# Patient Record
Sex: MALE | Race: ASIAN
[De-identification: names, ages, dates, MRNs, and addresses within clinical notes are randomized per-mention and may not be internally consistent; named-entity substitution may affect disease eponyms.]

---

## 2019-04-03 ENCOUNTER — HOSPITAL ENCOUNTER (INPATIENT)
Dept: HOSPITAL 36 - ER | Age: 69
LOS: 3 days | Discharge: NURSING, REHAB OR LONG-TERM CARE | DRG: 689 | End: 2019-04-06
Attending: FAMILY MEDICINE | Admitting: FAMILY MEDICINE
Payer: MEDICARE

## 2019-04-03 VITALS — DIASTOLIC BLOOD PRESSURE: 73 MMHG | SYSTOLIC BLOOD PRESSURE: 118 MMHG

## 2019-04-03 DIAGNOSIS — E78.5: ICD-10-CM

## 2019-04-03 DIAGNOSIS — R42: ICD-10-CM

## 2019-04-03 DIAGNOSIS — R53.2: ICD-10-CM

## 2019-04-03 DIAGNOSIS — I10: ICD-10-CM

## 2019-04-03 DIAGNOSIS — R55: ICD-10-CM

## 2019-04-03 DIAGNOSIS — Z59.0: ICD-10-CM

## 2019-04-03 DIAGNOSIS — R62.7: ICD-10-CM

## 2019-04-03 DIAGNOSIS — N39.0: Primary | ICD-10-CM

## 2019-04-03 DIAGNOSIS — B35.3: ICD-10-CM

## 2019-04-03 DIAGNOSIS — I69.351: ICD-10-CM

## 2019-04-03 LAB
ALBUMIN SERPL-MCNC: 4.3 GM/DL (ref 4.2–5.5)
ALBUMIN/GLOB SERPL: 1.2 {RATIO} (ref 1–1.8)
ALP SERPL-CCNC: 51 U/L (ref 34–104)
ALT SERPL-CCNC: 15 U/L (ref 7–52)
AMORPH SED URNS QL MICRO: (no result)
AMPHET UR-MCNC: NEGATIVE NG/ML
AMYLASE SERPL-CCNC: 243 U/L (ref 29–103)
ANION GAP SERPL CALC-SCNC: 13.1 MMOL/L (ref 7–16)
APAP SERPL-MCNC: < 10 UG/ML (ref 10–30)
APPEARANCE UR: (no result)
AST SERPL-CCNC: 16 U/L (ref 13–39)
BACTERIA #/AREA URNS HPF: (no result) /HPF
BARBITURATES UR-MCNC: NEGATIVE UG/ML
BASOPHILS # BLD AUTO: 0.1 TH/CUMM (ref 0–0.2)
BASOPHILS NFR BLD AUTO: 1 % (ref 0–2)
BENZODIAZEPINES PNL UR: NEGATIVE
BILIRUB SERPL-MCNC: 0.4 MG/DL (ref 0.3–1)
BILIRUB UR-MCNC: NEGATIVE MG/DL
BUN SERPL-MCNC: 23 MG/DL (ref 7–25)
CALCIUM SERPL-MCNC: 9.7 MG/DL (ref 8.6–10.3)
CANNABINOIDS SERPL QL CFM: NEGATIVE
CHLORIDE SERPL-SCNC: 107 MEQ/L (ref 98–107)
CHOLEST SERPL-MCNC: 170 MG/DL (ref ?–200)
CO2 SERPL-SCNC: 22.9 MEQ/L (ref 21–31)
COCAINE METAB.OTHER UR-MCNC: NEGATIVE NG/ML
COLOR UR: YELLOW
CREAT SERPL-MCNC: 1.4 MG/DL (ref 0.7–1.3)
EOSINOPHIL # BLD AUTO: 0.7 TH/CMM (ref 0.1–0.4)
EOSINOPHIL NFR BLD AUTO: 10.7 % (ref 0–5)
EPI CELLS URNS QL MICRO: (no result) /LPF
ERYTHROCYTE [DISTWIDTH] IN BLOOD BY AUTOMATED COUNT: 13 % (ref 11.5–20)
GLOBULIN SER-MCNC: 3.5 GM/DL
GLUCOSE SERPL-MCNC: 105 MG/DL (ref 70–105)
GLUCOSE UR STRIP-MCNC: NEGATIVE MG/DL
HCT VFR BLD CALC: 39.1 % (ref 41–60)
HDLC SERPL-MCNC: 50 MG/DL (ref 23–92)
HGB BLD-MCNC: 12.8 GM/DL (ref 12–16)
INR PPP: 0.88 (ref 0.5–1.4)
KETONES UR STRIP-MCNC: NEGATIVE MG/DL
LEUKOCYTE ESTERASE UR-ACNC: (no result)
LIPASE SERPL-CCNC: 64 U/L (ref 11–82)
LYMPHOCYTE AB SER FC-ACNC: 1.4 TH/CMM (ref 1.5–3)
LYMPHOCYTES NFR BLD AUTO: 21.9 % (ref 20–50)
MCH RBC QN AUTO: 27.5 PG (ref 27–31)
MCHC RBC AUTO-ENTMCNC: 32.7 PG (ref 28–36)
MCV RBC AUTO: 84.3 FL (ref 80–99)
METHADONE UR CFM-MCNC: NEGATIVE NG/ML
METHAMPHET UR QL: NEGATIVE
MICRO URNS: YES
MONOCYTES # BLD AUTO: 0.5 TH/CMM (ref 0.3–1)
MONOCYTES NFR BLD AUTO: 7.5 % (ref 2–10)
NEUTROPHILS # BLD: 3.5 TH/CMM (ref 1.8–8)
NEUTROPHILS NFR BLD AUTO: 58.9 % (ref 40–80)
NITRITE UR QL STRIP: POSITIVE
OPIATES UR QL: NEGATIVE
PCP UR-MCNC: NEGATIVE UG/L
PH UR STRIP: 6.5 [PH] (ref 4.6–8)
PLATELET # BLD: 396 TH/CMM (ref 150–400)
PMV BLD AUTO: 6.3 FL
POTASSIUM SERPL-SCNC: 4 MEQ/L (ref 3.5–5.1)
PROT UR STRIP-MCNC: 100 MG/DL
PROTHROMBIN TIME: 9.3 SECONDS (ref 9.5–11.5)
RBC # BLD AUTO: 4.64 MIL/CMM (ref 3.8–5.8)
RBC # UR STRIP: (no result) /UL
RBC #/AREA URNS HPF: (no result) /HPF (ref 0–5)
SALICYLATES SERPL-MCNC: < 25 MG/L (ref 30–100)
SODIUM SERPL-SCNC: 139 MEQ/L (ref 136–145)
SP GR UR STRIP: 1.01 (ref 1–1.03)
TRICYCLICS UR QL: NEGATIVE
TRIGL SERPL-MCNC: 94 MG/DL (ref ?–150)
URINALYSIS COMPLETE PNL UR: (no result)
UROBILINOGEN UR STRIP-ACNC: 0.2 E.U./DL (ref 0.2–1)
WBC # BLD AUTO: 6.2 TH/CMM (ref 4.8–10.8)
WBC #/AREA URNS HPF: (no result) /HPF (ref 0–5)

## 2019-04-03 PROCEDURE — Z7610: HCPCS

## 2019-04-03 RX ADMIN — DEXTROSE AND SODIUM CHLORIDE SCH MLS/HR: 5; .45 INJECTION, SOLUTION INTRAVENOUS at 23:24

## 2019-04-03 SDOH — ECONOMIC STABILITY - HOUSING INSECURITY: HOMELESSNESS: Z59.0

## 2019-04-03 NOTE — ED PHYSICIAN CHART
ED Chief Complaint/HPI





- Patient Information


Date Seen:: 19


Time Seen:: 18:00


Chief Complaint:: Poor Oral Intake


History of Present Illness:: 





onset x 3 days of poor oral intake, weakness, and dizziness/vertigo; no report 

of trauma, H/As, S/T, neck pain, cough, C/P, SOB, Abd. Pain, A/N/V/D/C, fever, 

chills, or urinary s/s


Historian:: Patient


Review:: Nurse's Note Reviewed, Old Chart Reviewed





ED Review of Systems





- Review of Systems


General/Constitutional: No fever, No chills, No weight loss, Weakness, No 

diaphoresis, No edema, No loss of appetite


Skin: No skin lesions, No rash, No bruising


Head: No headache, No light-headedness


Eyes: No loss of vision, No pain, No diplopia


ENT: No earache, No nasal drainage, No sore throat, No tinnitus


Neck: No neck pain, No swelling, No thyromegaly, No stiffness, No mass noted


Cardio Vascular: No chest pain, No palpitations, No PND, No orthopnea, No edema


Pulmonary: No SOB, No cough, No sputum, No wheezing


GI: No nausea, No vomiting, No diarrhea, No pain, No melena, No hematochezia, 

No constipation, No hematemesis


G/U: No dysuria, No frequency, No hematuria, No nacturia


Musculoskeletal: No bone or joint pain, No back pain, No muscle pain


Endocrine: No polyuria, No polydipsia


Psychiatric: No prior psych history, No depression, No anxiety, No suicidal 

ideation, No homicidal ideation, No auditory hallucination, No visual 

hallucination


Hematopoietic: No bruising, No lymphadenopathy


Allergic/Immuno: No urticaria, No angioedema


Neurological: No syncope, No focal symptoms, Weakness, No paresthesia, No 

headache, No seizure, Dizziness, No confusion, Vertigo





ED Past Medical History





- Past Medical History


Obtainable: Yes


Past Medical History: HTN, Dyslipidemia


Family History: HTN


Social History: Non Smoker, No Alcohol, No Drug Use, Single


Surgical History: None


Psychiatricy History: None


Medication: Reviewed





Family Medical History





- Family Member


  ** Mother


Living Status: 





ED Physical Exam





- Physical Examination


General/Constitutional: Awake, Well-developed, well-nourished, Alert, No 

distress, GCS 15, Non-toxic appearing, Ambulatory


Head: Atraumatic


Eyes: Lids, conjuctiva normal, PERRL, EOMI


Skin: Nl inspection, No rash, No skin lesions, No ecchymosis, Well hydrated, No 

lymphadenopathy


ENMT: External ears, nose nl, TM canals nl, Nasal exam nl, Lips, teeth, gums nl

, Oropharynx nl, Tonsils nl


Neck: Nontender, Full ROM w/o pain, No JVD, No nuchal rigidity, No bruit, No 

mass, No stridor


Respiratory: Nl effort/Exclusion, Clear to Auscultation, No Wheeze/Rhonchi/Rales


Cardio Vascular: RRR, No murmur, gallop, rubs, NL S1 S2, Carotid/Femoral/Distal 

pulses equal bilaterally


GI: No tenderness/rebounding/guarding, No organomegaly, No hernia, Normal BS's, 

Nondistended, No mass/bruits, No McBurney tenderness


: No CVA tenderness


Extremities: No tenderness or effusion, Full ROM, normal strength in all 

extremities, No edema, Normal digits & nails


Neuro/Psych: Alert/oriented, DTR's symmetric, Normal sensory exam, Normal motor 

strength, Judgement/insight normal, Mood normal, Normal gait, No focal deficits


Misc: Normal back, No paraspinal tenderness





ED Labs/Radiology/EKG Results





- Lab Results


Comments:: 





Reviewed





- Radiology Results


Comments:: 





CXR: COPD; NAD





- EKG Interpretations


EKG Time:: 18:22


Rate & Rhythm: 63; NSR


Comments:: 





non-specific st-t changes





ED Septic Shock





- .


Is Septic Shock (SBP<90, OR Lactate>4 mmol\L) present?: No





ED Reassessment (Disposition)





- Reassessment


Reassessment Condition:: Improved





- Diagnosis


Diagnosis:: 





Poor Oral Intake; Weakness; Dizziness; Vertigo; TIA; CVA

## 2019-04-04 LAB
ALBUMIN SERPL-MCNC: 3.6 GM/DL (ref 4.2–5.5)
ALBUMIN/GLOB SERPL: 1.2 {RATIO} (ref 1–1.8)
ALP SERPL-CCNC: 43 U/L (ref 34–104)
ALT SERPL-CCNC: 12 U/L (ref 7–52)
AMYLASE SERPL-CCNC: 323 U/L (ref 29–103)
ANION GAP SERPL CALC-SCNC: 11 MMOL/L (ref 7–16)
AST SERPL-CCNC: 14 U/L (ref 13–39)
BASOPHILS # BLD AUTO: 0 TH/CUMM (ref 0–0.2)
BASOPHILS NFR BLD AUTO: 0.5 % (ref 0–2)
BILIRUB SERPL-MCNC: 0.4 MG/DL (ref 0.3–1)
BUN SERPL-MCNC: 19 MG/DL (ref 7–25)
CALCIUM SERPL-MCNC: 9 MG/DL (ref 8.6–10.3)
CHLORIDE SERPL-SCNC: 109 MEQ/L (ref 98–107)
CO2 SERPL-SCNC: 23.5 MEQ/L (ref 21–31)
CREAT SERPL-MCNC: 1.3 MG/DL (ref 0.7–1.3)
EOSINOPHIL # BLD AUTO: 0.6 TH/CMM (ref 0.1–0.4)
EOSINOPHIL NFR BLD AUTO: 8.6 % (ref 0–5)
ERYTHROCYTE [DISTWIDTH] IN BLOOD BY AUTOMATED COUNT: 12.5 % (ref 11.5–20)
GLOBULIN SER-MCNC: 2.9 GM/DL
GLUCOSE SERPL-MCNC: 104 MG/DL (ref 70–105)
HCT VFR BLD CALC: 35.9 % (ref 41–60)
HGB BLD-MCNC: 12 GM/DL (ref 12–16)
LIPASE SERPL-CCNC: 500 U/L (ref 11–82)
LYMPHOCYTE AB SER FC-ACNC: 1 TH/CMM (ref 1.5–3)
LYMPHOCYTES NFR BLD AUTO: 15 % (ref 20–50)
MCH RBC QN AUTO: 27.9 PG (ref 27–31)
MCHC RBC AUTO-ENTMCNC: 33.4 PG (ref 28–36)
MCV RBC AUTO: 83.7 FL (ref 80–99)
MONOCYTES # BLD AUTO: 0.5 TH/CMM (ref 0.3–1)
MONOCYTES NFR BLD AUTO: 7.7 % (ref 2–10)
NEUTROPHILS # BLD: 4.8 TH/CMM (ref 1.8–8)
NEUTROPHILS NFR BLD AUTO: 68.2 % (ref 40–80)
PLATELET # BLD: 337 TH/CMM (ref 150–400)
PMV BLD AUTO: 6.8 FL
POTASSIUM SERPL-SCNC: 3.5 MEQ/L (ref 3.5–5.1)
RBC # BLD AUTO: 4.28 MIL/CMM (ref 3.8–5.8)
SODIUM SERPL-SCNC: 140 MEQ/L (ref 136–145)
WBC # BLD AUTO: 6.9 TH/CMM (ref 4.8–10.8)

## 2019-04-04 RX ADMIN — CLOTRIMAZOLE AND BETAMETHASONE DIPROPIONATE SCH APPL: 10; .5 CREAM TOPICAL at 09:44

## 2019-04-04 RX ADMIN — DEXTROSE AND SODIUM CHLORIDE SCH MLS/HR: 5; .45 INJECTION, SOLUTION INTRAVENOUS at 17:05

## 2019-04-04 RX ADMIN — CLOTRIMAZOLE AND BETAMETHASONE DIPROPIONATE SCH APPL: 10; .5 CREAM TOPICAL at 17:05

## 2019-04-04 RX ADMIN — Medication SCH: at 17:04

## 2019-04-04 NOTE — CONSULTATION
DATE OF CONSULTATION:  04/04/2019



NEUROLOGY CONSULTATION



HISTORY OF PRESENT ILLNESS:  A 68-year-old.  The patient complains of dizziness.

 Unsteadiness.  The patient with a history of stroke with right-sided

hemiplegia.  He has a brace over the right leg.  Right arm essentially no

movement, spastic, semi-flexed.  The patient able to talk and answer.



PAST MEDICAL HISTORY:

1.  Previous stroke.

2.  Hypertension.

3.  Dyslipidemia.



MEDICATIONS:  As per reconciliation.



REVIEW OF SYSTEMS:  Twelve point negative except for above.



PHYSICAL EXAMINATION:

VITAL SIGNS:  Temperature 98.3, blood pressure 130/70, pulse is 74.

NECK:  Supple.  No neck bruits.

HEART:  Sound S1, S2.

LUNGS:  Clear.

NEUROLOGIC:  The patient is awake.  The patient will answer question slightly

dysarthric speech.  Pupils react to light.  Right facial weakness, droop.

MOTOR:  He has spasticity of the right arm, semi-flexed.  Not much movement. 

Fingers are curled up.  Right legs, is able to lift up, but limited movement,

increased tone.  There is dressing on the right foot.  On the left side, he is

able to move them up.  Reflexes 1-2+ upper extremity, more on the right.  Knees

are about 2+.  Ankles -1.  Withdrawal of the toes.



INVESTIGATIONS:  CT scan of the head, no acute process.  Cervical spine CT, no

acute process.



LABORATORY DATA:  WBC 6.9, hemoglobin 12.0, and platelets 337.  Sodium 140.  TSH

0.88.  Lipase is 500 and amylase 323.  The patient has positive UA with WBCs 2-5

and 4+ bacteria.



ASSESSMENT:

1.  Unsteadiness, dizziness.  No acute stroke in the CT scan.  We will go ahead

and do carotid Doppler studies.  Check his labs.

2.  History of previous stroke.

3.  Hypertension.

4.  Urinary tract infection.



The patient will continue present treatment, will need physical therapy.





DD: 04/04/2019 07:48

DT: 04/04/2019 22:46

JOB# [id number]  [id number]

## 2019-04-04 NOTE — DIAGNOSTIC IMAGING REPORT
CT scan of the brain without intravenous contrast



HISTORY: Dizziness



Total DLP equals 288



CTDI equals 15.7



Axial sections were obtained from the base of the skull to the vertex.



There is prominence/enlargement of the ventricular system size.

Associated enlargement of cerebral sulci and subarachnoid cisterns.

Findings are consistent with changes of generalized cerebral atrophy. No

acute parenchymal abnormalities. No acute cerebral hemorrhage.

Hypodensity is seen within the supratentorial white matter regions

without mass effect. The findings may be associated with chronic small

vessel ischemic disease. No extra-axial masses or abnormal fluid

collections.  Vascular calcifications.



IMPRESSION:

1. No acute abnormalities

2. Cerebral atrophy

3. Supratentorial white matter changes that may reflect chronic small

vessel ischemic disease

## 2019-04-04 NOTE — INFECTIOUS DISEASE PROG NOTE
Infectious Disease Subjective





- Review of Systems


Service Date: 04/04/19


Subjective: 





There is no new change, no fever.





Infectious Disease Objective





- Results


Result Diagrams: 


 04/04/19 06:10





 04/04/19 06:10


Recent Labs: 


 Laboratory Last Values











WBC  6.9 Th/cmm (4.8-10.8)   04/04/19  06:10    


 


RBC  4.28 Mil/cmm (3.80-5.80)   04/04/19  06:10    


 


Hgb  12.0 gm/dL (12-16)   04/04/19  06:10    


 


Hct  35.9 % (41.0-60)  L  04/04/19  06:10    


 


MCV  83.7 fl (80-99)   04/04/19  06:10    


 


MCH  27.9 pg (27.0-31.0)   04/04/19  06:10    


 


MCHC Differential  33.4 pg (28.0-36.0)   04/04/19  06:10    


 


RDW  12.5 % (11.5-20.0)   04/04/19  06:10    


 


Plt Count  337 Th/cmm (150-400)   04/04/19  06:10    


 


MPV  6.8 fl  04/04/19  06:10    


 


Neutrophils %  68.2 % (40.0-80.0)   04/04/19  06:10    


 


Lymphocytes %  15.0 % (20.0-50.0)  L  04/04/19  06:10    


 


Monocytes %  7.7 % (2.0-10.0)   04/04/19  06:10    


 


Eosinophils %  8.6 % (0.0-5.0)  H  04/04/19  06:10    


 


Basophils %  0.5 % (0.0-2.0)   04/04/19  06:10    


 


PT  9.3 SECONDS (9.5-11.5)  L  04/03/19  18:35    


 


INR  0.88  (0.5-1.4)   04/03/19  18:35    


 


PTT (Actin FS)  25.6 SECONDS (26.0-38.0)  L  04/03/19  18:35    


 


Sodium  140 mEq/L (136-145)   04/04/19  06:10    


 


Potassium  3.5 mEq/L (3.5-5.1)   04/04/19  06:10    


 


Chloride  109 mEq/L ()  H  04/04/19  06:10    


 


Carbon Dioxide  23.5 mEq/L (21.0-31.0)   04/04/19  06:10    


 


Anion Gap  11.0  (7.0-16.0)   04/04/19  06:10    


 


BUN  19 mg/dL (7-25)   04/04/19  06:10    


 


Creatinine  1.3 mg/dL (0.7-1.3)   04/04/19  06:10    


 


Est GFR ( Amer)  > 60.0 ml/min (>90)   04/04/19  06:10    


 


Est GFR (Non-Af Amer)  58.7 ml/min  04/04/19  06:10    


 


BUN/Creatinine Ratio  14.6   04/04/19  06:10    


 


Glucose  104 mg/dL ()   04/04/19  06:10    


 


Whole Bld Lactic Acid  0.65 mmol/L (0.60-1.99)   04/03/19  18:35    


 


Calcium  9.0 mg/dL (8.6-10.3)   04/04/19  06:10    


 


Total Bilirubin  0.4 mg/dL (0.3-1.0)   04/04/19  06:10    


 


AST  14 U/L (13-39)   04/04/19  06:10    


 


ALT  12 U/L (7-52)   04/04/19  06:10    


 


Alkaline Phosphatase  43 U/L ()   04/04/19  06:10    


 


Ammonia  39 umol/L (16-53)   04/04/19  09:10    


 


Creatine Kinase  173 U/L ()   04/03/19  18:35    


 


Troponin I  0.01 ng/mL (0.01-0.05)   04/03/19  18:35    


 


Total Protein  6.5 gm/dL (6.0-8.3)   04/04/19  06:10    


 


Albumin  3.6 gm/dL (4.2-5.5)  L  04/04/19  06:10    


 


Globulin  2.9 gm/dL  04/04/19  06:10    


 


Albumin/Globulin Ratio  1.2  (1.0-1.8)   04/04/19  06:10    


 


Triglycerides  94 mg/dL (<150)   04/03/19  18:35    


 


Cholesterol  170 mg/dL (<200)   04/03/19  18:35    


 


LDL Cholesterol Direct  109 mg/dL ()   04/03/19  18:35    


 


HDL Cholesterol  50 mg/dL (23-92)   04/03/19  18:35    


 


Amylase  323 U/L ()  H  04/04/19  06:10    


 


Lipase  500 U/L (11-82)  H  04/04/19  06:10    


 


TSH  0.94 uIU/ml (0.34-5.60)   04/04/19  06:10    


 


Urine Source  MIDSTREAM   04/03/19  18:05    


 


Urine Color  YELLOW   04/03/19  18:05    


 


Urine Clarity  HAZY  (CLEAR)   04/03/19  18:05    


 


Urine pH  6.5  (4.6 - 8.0)   04/03/19  18:05    


 


Ur Specific Gravity  1.015  (1.005-1.030)   04/03/19  18:05    


 


Urine Protein  100 mg/dL (NEGATIVE)  H  04/03/19  18:05    


 


Urine Glucose (UA)  NEGATIVE mg/dL (NEGATIVE)   04/03/19  18:05    


 


Urine Ketones  NEGATIVE mg/dL (NEGATIVE)   04/03/19  18:05    


 


Urine Blood  TRACE  (NEGATIVE)   04/03/19  18:05    


 


Urine Nitrate  POSITIVE  (NEGATIVE)  H  04/03/19  18:05    


 


Urine Bilirubin  NEGATIVE  (NEGATIVE)   04/03/19  18:05    


 


Urine Urobilinogen  0.2 E.U./dL (0.2 - 1.0)   04/03/19  18:05    


 


Ur Leukocyte Esterase  TRACE  (NEGATIVE)  H  04/03/19  18:05    


 


Urine RBC  0-2 /hpf (0-5)  H  04/03/19  18:05    


 


Urine WBC  2-5 /hpf (0-5)   04/03/19  18:05    


 


Ur Epithelial Cells  FEW /lpf (FEW)   04/03/19  18:05    


 


Amorphous Sediment  MODERATE URATES  (NONE SEEN)   04/03/19  18:05    


 


Urine Bacteria  4+ /hpf (NONE SEEN)  H  04/03/19  18:05    


 


Salicylates  < 25.0 mg/L (30.0-100.0)  L  04/03/19  18:35    


 


Urine Opiates Screen  NEGATIVE  (NEGATIVE)   04/03/19  18:05    


 


Urine Methadone Screen  NEGATIVE  (NEGATIVE)   04/03/19  18:05    


 


Acetaminophen  < 10.0 ug/mL (10.0-30.0)  L  04/03/19  18:35    


 


Ur Barbiturates Screen  NEGATIVE  (NEGATIVE)   04/03/19  18:05    


 


Ur Tricyclics Screen  NEGATIVE  (NEGATIVE)   04/03/19  18:05    


 


Ur Phencyclidine Scrn  NEGATIVE  (NEGATIVE)   04/03/19  18:05    


 


Amphetamines Screen  NEGATIVE  (NEGATIVE)   04/03/19  18:05    


 


U Methamphetamines Scrn  NEGATIVE  (NEGATIVE)   04/03/19  18:05    


 


U Benzodiazepines Scrn  NEGATIVE  (NEGATIVE)   04/03/19  18:05    


 


U Cocaine Metab Screen  NEGATIVE  (NEGATIVE)   04/03/19  18:05    


 


U Cannabinoids Screen  NEGATIVE  (NEGATIVE)   04/03/19  18:05    


 


Ethyl Alcohol  < 10 mg/dL (0-10)   04/03/19  18:35    














- Physical Exam


Vitals and I&O: 


 Vital Signs











Temp  98.5 F   04/04/19 11:32


 


Pulse  70   04/04/19 11:32


 


Resp  18   04/04/19 11:32


 


BP  104/57   04/04/19 11:32


 


Pulse Ox  97   04/04/19 11:32








 Intake & Output











 04/03/19 04/04/19 04/04/19





 18:59 06:59 18:59


 


Weight (lbs) 56.699 kg  


 


Other:   


 


  Weight Source Patient stated  











Active Medications: 


Current Medications





Amlodipine Besylate (Norvasc)  5 mg PO DAILY [facility]


   Stop: 06/02/19 23:08


   Last Admin: 04/04/19 09:44 Dose:  Not Given


Betamethasone/Clotrimazole (Lotrisone Cream)  1 appl TP BID [facility]


   Stop: 06/03/19 08:59


   Last Admin: 04/04/19 09:44 Dose:  1 appl


Ceftriaxone Sodium 1 gm/ (Sodium Chloride)  50 mls @ 100 mls/hr IV Q24HR [facility]


   Stop: 06/02/19 22:59


   Last Admin: 04/03/19 23:25 Dose:  100 mls/hr


Dextrose/Sodium Chloride (D5-0.45ns)  1,000 mls @ 60 mls/hr IV .[id number] [facility]


   Stop: 06/02/19 22:59


   Last Admin: 04/03/19 23:24 Dose:  60 mls/hr


Multi-Ingredient Cream (Eucerin Cream)  1 appl TP BID [facility]


   Stop: 06/03/19 16:59


Tamsulosin HCl (Flomax)  0.4 mg PO HS [facility]


   Stop: 06/02/19 23:09


   Last Admin: 04/03/19 23:19 Dose:  0.4 mg








General: no acute distress, well developed, well nourished


HEENT: atraumatic, normocephalic, PERRLA, EOMI


Neck: supple, no thyromegaly


Cardiovascular: S1S2, regular


Lungs: clear to auscultation bilaterally, clear to percussion


Abdomen: soft, no tender, no distended


Extremities: other (The patient has pitting edema of both), no cyanosis, no 

clubbing, no edema


Neurological: awake, alert, oriented


Skin: intact





Infectious Disease Assmt/Plan





- Assessment


Assessment: 





IMPRESSION:


1. Urinary tract infection.


2. Tinea pedis.


3. Hypertension.


4. Dyslipidemia.














- Plan


Plan: 





apply Lotrisone cream and continue Rocephin.  Follow the culture report and go 

from there.  The patient has to keep appropriate hygiene.

## 2019-04-04 NOTE — CONSULTATION
DATE OF CONSULTATION:  04/03/2019



REFERRING PHYSICIAN:  Aurelio Lord MD



REASON FOR CONSULTATION:  Right foot ticklish sensations.



HISTORY OF PRESENT ILLNESS:  The patient is a 68-year-old male with a past

medical history of hypertension and dyslipidemia, presented to the ER with poor

oral intake, weakness and dizziness.  On initial evaluation, the patient was

afebrile with temperature 98 degree Fahrenheit.  WBC count was 6200.  Creatinine

1.4.  Urinalysis showed nitrite positive, trace leukoesterase, WBC 2-5 and 4+

bacteria.  The patient was started on Rocephin and ID consult was called for

further antibiotic management.



PAST MEDICAL HISTORY:  As mentioned above, hypertension and dyslipidemia.



SOCIAL HISTORY:  The patient is homeless.  No history of smoking, alcohol or

drug use.  Single.



FAMILY HISTORY:  Hypertension.



PSYCHIATRIC HISTORY:  None.



REVIEW OF SYSTEMS:

GENERAL:  The patient has no fever, no chills.  The patient has generalized

weakness and some poor oral intake.

HEENT:  No diplopia, no photophobia, no sore throat.

RESPIRATORY:  No cough, no shortness of breath.

CARDIOVASCULAR:  No chest pain or palpitation.

GASTROINTESTINAL:  No nausea, no vomiting, no diarrhea, no constipation.

GENITOURINARY:  No dysuria.

NEUROLOGIC:  No headache, no dizziness, no focal weakness.

RESPIRATORY:  No cough, no shortness of breath.

CVS:  No chest pain, no palpitation.  The patient has leg swelling.

GASTROINTESTINAL:  No nausea, no vomiting.  No diarrhea or constipation.

GENITOURINARY:  No dysuria.

NEUROLOGICAL:  No headache, no dizziness, no focal weakness.



PHYSICAL EXAMINATION:

VITAL SIGNS:  Currently shows temperature of 97.8, pulse 83, respirations 16,

blood pressure 120/69, and oxygen saturation 97%.

GENERAL:  The patient is comfortable lying in the bed, not in acute distress.

HEENT:  Head is normocephalic and atraumatic.  Oral cavity moist, pink tongue. 

Eyes:  No pallor, no icterus.  PERRLA.  EOMI.

NECK:  Supple, no JVD, no bruit.  Trachea midline.

CHEST:  Bilateral breath sounds.  No crackles or wheezing.

HEART:  S1, S2 within normal limits.  Regular rhythm.  No murmur, no gallop.

ABDOMEN:  Soft, nontender, and nondistended.  Bowel sounds present.

EXTREMITIES:  No cyanosis, no clubbing.  The patient has pitting edema of both

lower extremities involving the feet.  The patient has very dry skin with

excoriation and fungal growth of both feet.

NEUROLOGIC:  Alert, awake, and oriented x 3.



LABORATORY DATA:  Current lab shows WBC count is 6200, hemoglobin is 12.8,

platelets are 396,000, and neutrophils 59%.  INR 0.88, sodium is 139, potassium

4, chloride 107, bicarbonate 23, BUN is 23, creatinine 1.4, and glucose is 105. 

LFTs reviewed.  Urinalysis showed positive nitrite, trace leukoesterase, WBC

2-5, 4+ bacteria.



IMPRESSION:

1. Urinary tract infection.

2. Tinea pedis.

3. Hypertension.

4. Dyslipidemia.



RECOMMENDATIONS:  We will apply Lotrisone cream and continue Rocephin.  Follow

the culture report and go from there.  The patient has to keep appropriate

hygiene.



Thank you, Dr. Carey for involving me in taking care of this patient.





DD: 04/04/2019 00:56

DT: 04/04/2019 07:57

[facility]# [id number]  [id number]

## 2019-04-04 NOTE — DIAGNOSTIC IMAGING REPORT
Chest x-ray single view 



History: Pain



Comparison: None



The heart size is normal. No focal pulmonary parenchymal processes. No

hilar or mediastinal abnormalities.  COPD changes are noted.



Impression: No acute abnormalities, COPD changes.

## 2019-04-04 NOTE — HISTORY & PHYSICAL
ADMIT DATE:  04/04/2019



DICTATED FOR:  Dr. Carey.



CHIEF COMPLAINT:  Weakness and dizziness.



HISTORY OF PRESENT ILLNESS:  This is a 68-year-old [place] male who has a 3-day

history of weakness according to the patient, yesterday he was at his group home

and stated that he felt very weak and dizzy.  He tried to sit on his wheelchair;

however, he could not and states that he landed on his bottom.  He denies any

pain.  Due to his increase of weakness and status post fall, the patient is now

admitted here to the med/surg unit.  The patient denies any chest pain.  The

patient states that he is eating fine with no problem.



PAST MEDICAL HISTORY:  Hypertension and dyslipidemia.



FAMILY HISTORY:  Noncontributory.



SOCIAL HISTORY:  The patient has a supportive family, has lived in the board and

care type place.



SURGICAL HISTORY:  None.



MEDICATIONS:  See medication list.



REVIEW OF SYSTEMS:

GENERAL:  Denies any fever and chills.

CARDIOVASCULAR:  Denies any chest pain.

RESPIRATORY:  Denies shortness of breath.

GASTROINTESTINAL:  Denies nausea, vomiting, abdominal pain.

GENITOURINARY:  Denies increased frequency or dysuria.

NEUROLOGIC:  Denies headache, seizure, or syncope.

All systems reviewed and negative.



PHYSICAL EXAMINATION:

GENERAL:  The patient is a well developed, well nourished, in no apparent

distress.

VITAL SIGNS:  Temperature 99.5, heart rate 70, blood pressure 105/57,

respiration 18, and O2 97%.

HEENT:  Head; normocephalic, atraumatic.

NECK:  Supple.  No mass.

LUNGS:  Clear bilaterally.

HEART:  Regular rate and rhythm.

ABDOMEN:  Soft, nontender.

EXTREMITIES:  No edema noted.



LABORATORY DATA:  WBC 6.9, H and H 12.0 and 35.9, and platelet of 337.  Sodium

140, potassium 3.5, chloride 109, BUN 19, and creatinine 1.3.



Amylase 223 and lipase 500.  Urinalysis positive for UTI.



DIAGNOSTICS:  The patient had a CT of the head done, impression is no acute

abnormalities.  The patient also had a CT of the cervical spine impression is no

acute abnormalities as well.



ASSESSMENT:  Generalized weakness, acute urinary tract infection, near syncope,

hypertension, and benign prostatic hypertrophy.



PLAN:  The patient to be admitted to the med/surg unit.  We will get Infectious

Disease on the case.  We will get PT evaluation as well as Neurology

consultation.  We will get carotid ultrasound.  Continue the patient on home

medications.  IV fluids for hydration.  We will monitor the patient's

electrolytes closely.  We will continue to monitor this patient.





DD: 04/04/2019 18:47

DT: 04/04/2019 20:08

JOB# [id number]  [id number]

## 2019-04-04 NOTE — CONSULTATION
DATE OF CONSULTATION:  04/04/2019



REQUESTING PHYSICIAN:  Farooq Carey M.D.



REASON FOR CONSULTATION:  Failure to thrive.



Thank you for asking me to see this patient in consultation.



HISTORY OF PRESENT ILLNESS:  This is a 68-year-old male with history of major

CVA with right-sided hemiparesis, who presents for failure to thrive.  The

patient has been moving from place to place as he is foreclosed on his home and

he has been having difficulty finding places to stay as well as feed.  The

patient is currently being worked up for a urinary tract infection, tinea pedis

as well as poor p.o. intake and GI has been asked to assess further.



PAST MEDICAL HISTORY:  As mentioned above.



SOCIAL HISTORY:  He is homeless.  No tobacco, alcohol, or drugs.



FAMILY HISTORY:  Noncontributory for GI disease.



REVIEW OF SYSTEMS:  As in HPI.  All other 12-point systems are negative.



PHYSICAL EXAMINATION:

VITAL SIGNS:  Temperature 97.8, pulse of 83, respiratory rate of 16, blood

pressure is 120/69, satting 97% on room air.

GENERAL:  He is in no acute distress.

HEENT:  Normocephalic, atraumatic.  PERRLA positive.

LUNGS:  Clear bilaterally.  No wheezes, rales, or rhonchi.

HEART:  Regular rate and rhythm, normal S1, S2.

ABDOMEN:  Soft, nontender.  Bowel sounds are positive.

EXTREMITIES:  Show no lower extremity edema.

PSYCHOLOGICAL:  Alert and oriented x3.

NEUROLOGIC:  Decreased power and motor sensation of the right side.



LABORATORY DATA:  White count of 6200, hemoglobin 12.8, and platelets of

396,000.  INR 0.8.  Sodium 139 and potassium 4.



ASSESSMENT AND PLAN:  This is a 68-year-old male with history of cerebrovascular

accident and poor nutritional status and poor social status, who presents for

failure to thrive.

1.  Poor p.o. intake.

2.  Failure to thrive.

3.  History of cerebrovascular accident.

4.  Right-sided hemiparesis.



No current signs or symptoms of any oropharyngeal dysphagia.  The patient is

currently tolerating his breakfast and lunch thus far.  If necessary can obtain

a calorie count to see if the patient is meeting adequate demands; however,

suspect that he will continue to follow alongside with you.



Thank you for allowing us to participate in this patient's care.





DD: 04/04/2019 15:52

DT: 04/04/2019 16:33

[facility]# [id number]  [id number]

## 2019-04-04 NOTE — DIAGNOSTIC IMAGING REPORT
CT scan cervical spine



History: Dizziness



Total DLP equals 288



CTDI equals 15.7 



Axial sections were obtained through the cervical spine region.

Additional sagittal and coronal reformatted images are provided.



There is evidence for degenerative osteoarthritis with narrowing of the

C6-C7 intervertebral disc space with vacuum disc phenomenon.  There is

mild ossific spurring and the bridging at the C6-C7 level. 

Specifically, no fractures are identified. There is limited

visualization of the margins of the cervical spinal cord. No obvious

extradural soft tissue abnormalities are seen. The prevertebral soft

tissues appear normal.



Impression: 



No acute abnormalities.



Mild degenerative changes.

## 2019-04-05 RX ADMIN — Medication SCH APPL: at 17:41

## 2019-04-05 RX ADMIN — DEXTROSE AND SODIUM CHLORIDE SCH MLS/HR: 5; .45 INJECTION, SOLUTION INTRAVENOUS at 11:09

## 2019-04-05 RX ADMIN — CLOTRIMAZOLE AND BETAMETHASONE DIPROPIONATE SCH APPL: 10; .5 CREAM TOPICAL at 09:47

## 2019-04-05 RX ADMIN — Medication SCH APPL: at 09:47

## 2019-04-05 RX ADMIN — CLOTRIMAZOLE AND BETAMETHASONE DIPROPIONATE SCH APPL: 10; .5 CREAM TOPICAL at 17:41

## 2019-04-05 NOTE — DIAGNOSTIC IMAGING REPORT
Bilateral carotid Doppler ultrasound exam



HISTORY: Stroke, CVA



Sonographic sector images were obtained through the carotid bifurcation

regions bilaterally.  Associated Doppler data was obtained.



The exam of the right side demonstrates generalized intimal thickening

and mild focal plaque in the common carotid artery.  No significant

narrowing or stenosis.  Antegrade vertebral artery flow.  Velocities and

flow ratios are normal (ICC/CCA equals 0.6).



The left side demonstrates generalized intimal thickening and mild

multifocal plaque.  No significant narrowing or stenosis.  Antegrade

vertebral artery flow.  Velocities and flow ratios are normal (ICC/CCA

equals 1.1).



IMPRESSION:

1.  Mild bilateral atherosclerotic changes which do not appear to be

hemodynamically significant.

## 2019-04-05 NOTE — INTERNAL MEDICINE PROG NOTE
Internal Medicine Subjective





- Subjective


Service Date: 19


Patient seen and examined:: chart reviewed


Patient is:: awake, verbal, interactive, in bed, denies any new complaints


Per staff patient has:: no adverse event





Internal Medicine Objective





- Results


Result Diagrams: 


 19 06:10





 19 06:10


Recent Labs: 


 Laboratory Last Values











WBC  6.9 Th/cmm (4.8-10.8)   19  06:10    


 


RBC  4.28 Mil/cmm (3.80-5.80)   19  06:10    


 


Hgb  12.0 gm/dL (12-16)   19  06:10    


 


Hct  35.9 % (41.0-60)  L  19  06:10    


 


MCV  83.7 fl (80-99)   19  06:10    


 


MCH  27.9 pg (27.0-31.0)   19  06:10    


 


MCHC Differential  33.4 pg (28.0-36.0)   19  06:10    


 


RDW  12.5 % (11.5-20.0)   19  06:10    


 


Plt Count  337 Th/cmm (150-400)   19  06:10    


 


MPV  6.8 fl  19  06:10    


 


Neutrophils %  68.2 % (40.0-80.0)   19  06:10    


 


Lymphocytes %  15.0 % (20.0-50.0)  L  19  06:10    


 


Monocytes %  7.7 % (2.0-10.0)   19  06:10    


 


Eosinophils %  8.6 % (0.0-5.0)  H  19  06:10    


 


Basophils %  0.5 % (0.0-2.0)   19  06:10    


 


PT  9.3 SECONDS (9.5-11.5)  L  19  18:35    


 


INR  0.88  (0.5-1.4)   19  18:35    


 


PTT (Actin FS)  25.6 SECONDS (26.0-38.0)  L  19  18:35    


 


Sodium  140 mEq/L (136-145)   19  06:10    


 


Potassium  3.5 mEq/L (3.5-5.1)   19  06:10    


 


Chloride  109 mEq/L ()  H  19  06:10    


 


Carbon Dioxide  23.5 mEq/L (21.0-31.0)   19  06:10    


 


Anion Gap  11.0  (7.0-16.0)   19  06:10    


 


BUN  19 mg/dL (7-25)   19  06:10    


 


Creatinine  1.3 mg/dL (0.7-1.3)   19  06:10    


 


Est GFR ( Amer)  > 60.0 ml/min (>90)   19  06:10    


 


Est GFR (Non-Af Amer)  58.7 ml/min  19  06:10    


 


BUN/Creatinine Ratio  14.6   19  06:10    


 


Glucose  104 mg/dL ()   19  06:10    


 


Whole Bld Lactic Acid  0.65 mmol/L (0.60-1.99)   19  18:35    


 


Calcium  9.0 mg/dL (8.6-10.3)   19  06:10    


 


Total Bilirubin  0.4 mg/dL (0.3-1.0)   19  06:10    


 


AST  14 U/L (13-39)   19  06:10    


 


ALT  12 U/L (7-52)   19  06:10    


 


Alkaline Phosphatase  43 U/L ()   19  06:10    


 


Ammonia  39 umol/L (16-53)   19  09:10    


 


Creatine Kinase  173 U/L ()   19  18:35    


 


Troponin I  0.01 ng/mL (0.01-0.05)   19  18:35    


 


C-Reactive Protein  < 0.2 mg/dL (0.0-0.9)   19  06:10    


 


Total Protein  6.5 gm/dL (6.0-8.3)   19  06:10    


 


Albumin  3.6 gm/dL (4.2-5.5)  L  19  06:10    


 


Globulin  2.9 gm/dL  19  06:10    


 


Albumin/Globulin Ratio  1.2  (1.0-1.8)   19  06:10    


 


Triglycerides  94 mg/dL (<150)   19  18:35    


 


Cholesterol  170 mg/dL (<200)   19  18:35    


 


LDL Cholesterol Direct  109 mg/dL ()   19  18:35    


 


HDL Cholesterol  50 mg/dL (23-92)   19  18:35    


 


Amylase  323 U/L ()  H  19  06:10    


 


Lipase  500 U/L (11-82)  H  19  06:10    


 


TSH  0.94 uIU/ml (0.34-5.60)   19  06:10    


 


Urine Source  MIDSTREAM   19  18:05    


 


Urine Color  YELLOW   19  18:05    


 


Urine Clarity  HAZY  (CLEAR)   19  18:05    


 


Urine pH  6.5  (4.6 - 8.0)   19  18:05    


 


Ur Specific Gravity  1.015  (1.005-1.030)   19  18:05    


 


Urine Protein  100 mg/dL (NEGATIVE)  H  19  18:05    


 


Urine Glucose (UA)  NEGATIVE mg/dL (NEGATIVE)   19  18:05    


 


Urine Ketones  NEGATIVE mg/dL (NEGATIVE)   19  18:05    


 


Urine Blood  TRACE  (NEGATIVE)   19  18:05    


 


Urine Nitrate  POSITIVE  (NEGATIVE)  H  19  18:05    


 


Urine Bilirubin  NEGATIVE  (NEGATIVE)   19  18:05    


 


Urine Urobilinogen  0.2 E.U./dL (0.2 - 1.0)   19  18:05    


 


Ur Leukocyte Esterase  TRACE  (NEGATIVE)  H  19  18:05    


 


Urine RBC  0-2 /hpf (0-5)  H  19  18:05    


 


Urine WBC  2-5 /hpf (0-5)   19  18:05    


 


Ur Epithelial Cells  FEW /lpf (FEW)   19  18:05    


 


Amorphous Sediment  MODERATE URATES  (NONE SEEN)   19  18:05    


 


Urine Bacteria  4+ /hpf (NONE SEEN)  H  19  18:05    


 


Salicylates  < 25.0 mg/L (30.0-100.0)  L  19  18:35    


 


Urine Opiates Screen  NEGATIVE  (NEGATIVE)   19  18:05    


 


Urine Methadone Screen  NEGATIVE  (NEGATIVE)   19  18:05    


 


Acetaminophen  < 10.0 ug/mL (10.0-30.0)  L  19  18:35    


 


Ur Barbiturates Screen  NEGATIVE  (NEGATIVE)   19  18:05    


 


Ur Tricyclics Screen  NEGATIVE  (NEGATIVE)   19  18:05    


 


Ur Phencyclidine Scrn  NEGATIVE  (NEGATIVE)   19  18:05    


 


Amphetamines Screen  NEGATIVE  (NEGATIVE)   19  18:05    


 


U Methamphetamines Scrn  NEGATIVE  (NEGATIVE)   19  18:05    


 


U Benzodiazepines Scrn  NEGATIVE  (NEGATIVE)   19  18:05    


 


U Cocaine Metab Screen  NEGATIVE  (NEGATIVE)   19  18:05    


 


U Cannabinoids Screen  NEGATIVE  (NEGATIVE)   19  18:05    


 


Ethyl Alcohol  < 10 mg/dL (0-10)   19  18:35    














- Physical Exam


Vitals and I&O: 


 Vital Signs











Temp  98.5 F   19 11:48


 


Pulse  96   19 11:48


 


Resp  18   19 11:48


 


BP  121/69   19 11:48


 


Pulse Ox  100   19 11:48








 Intake & Output











 19





 18:59 06:59 18:59


 


Intake Total 1000 60 1000


 


Output Total  1000 


 


Balance 1000 -940 1000


 


Weight (lbs)  56.699 kg 


 


Intake:   


 


  Intake, IV Amount 1000  1000


 


    D5-0.45NS 1,000 ml @ 60 1000  1000





    mls/hr IV .[id number] [facility]   





    #:[id number]   


 


  Oral  60 


 


Output:   


 


  Urine  1000 


 


Other:   


 


  # Bowel Movements  1 


 


  Weight Source  Bedscale 











Active Medications: 


Current Medications





Amlodipine Besylate (Norvasc)  5 mg PO DAILY AMOS


   Stop: 19 10:15


   Last Admin: 19 11:09 Dose:  5 mg


Betamethasone/Clotrimazole (Lotrisone Cream)  1 appl TP BID AMOS


   Stop: 19 08:59


   Last Admin: 19 09:47 Dose:  1 appl


Ceftriaxone Sodium 1 gm/ (Sodium Chloride)  50 mls @ 100 mls/hr IV Q24HR AMOS


   Stop: 19 22:59


   Last Admin: 19 22:46 Dose:  100 mls/hr


Dextrose/Sodium Chloride (D5-0.45ns)  1,000 mls @ 60 mls/hr IV .[id number] AMOS


   Stop: 19 22:59


   Last Admin: 19 11:09 Dose:  60 mls/hr


Multi-Ingredient Cream (Eucerin Cream)  1 appl TP BID AMOS


   Stop: 19 16:59


   Last Admin: 19 09:47 Dose:  1 appl


Tamsulosin HCl (Flomax)  0.4 mg PO HS AMOS


   Stop: 19 23:09


   Last Admin: 19 20:44 Dose:  0.4 mg











Nutritional Asmnt/Malnutr-PDOC





- Dietary Evaluation


Malnutrition Findings (Please click <Entered> for more info): 








Nutritional Asmnt/Malnutrition                             Start:  19 16:

32


Text:                                                      Status: Complete    

  


Freq:                                                                          

  


Protocol:                                                                      

  


 Document     19 16:32  LCHENG  (Rec: 19 16:43  LCHENG  [id number])


 Nutritional Asmnt/Malnutrition


     Patient General Information


      Diagnosis                                  generalized weakness/FTT


      Pertinent Medical Hx/Surgical Hx           HTN, dyslipidemia


      Subjective Information                     Consult received for Constantino Patel


                                                 . Pt seen lying in bed at time


                                                 of visit, awake and alert. Pt


                                                 stated food is good, appetite


                                                 is good. Food preference


                                                 provided to RD.


      Current Diet Order/ Nutrition Support      regular


      Pertinent Medications                      D5-0.45ns


      Pertinent Labs                             4/4 Cl 109, alb 3.6


     Nutritional Hx/Data


      Height                                     1.63 m


      Height (Calculated Centimeters)            162.6


      Current Weight (lbs)                       56.699 kg


      Weight (Calculated Kilograms)              56.7


      Weight (Calculated Grams)                  92798.0


      Ideal Body Weight                          120


      Body Mass Index (BMI)                      21.4


     GI Symptoms


      GI Symptoms                                None


      Last BM                                    not indicated


      Difficult in:                              None


      Skin Integrity/Comment:                    swelling to L/F foot


                                                 1~2+ non-pitting edema


     Estimated Nutritional Goals


      BEE in Kcals:                              Using Current wt


      Calories/Kcals/Kg                          25-30


      Kcals Calculated                           9079-7162


      Protein:                                   Using Current wt


      Protein g/k


      Protein Calculated                         57


      Fluid: ml                                  1425-1710ml (1ml/kcal)


     Nutritional Problem


      No current Nutrition Prob


       Problem                                   N/A


     Malnutrition Alert


      Is there a minimum of two criteria         No


       selected?                                 


       Query Text:Check all the applicable       


       criteria. A minimum of two criteria are   


       recommended for diagnosis of either       


       severe or non-severe malnutrition.        


     Malnutrition Related to Morbid Obesity


      Malnutrition related to morbid obesity     No


     Intervention/Recommendation


      Comments                                   1. Continue with regular diet


                                                 as ordered.


                                                 2. Monitor PO intake, wt, labs


                                                 and skin integrity


                                                 3. F/U as moderate risk in 3-5


                                                 days


     Expected Outcomes/Goals


      Expected Outcomes/Goals                    1. PO intake to meet at least


                                                 75% of nutritional needs.


                                                 2. Wt stability, skin to


                                                 remain intact, labs to


                                                 approach WNL.

## 2019-04-05 NOTE — GI PROGRESS NOTE
Subjective





- Review of Systems


Service Date: 04/05/19


Events since last encounter: 





No events, doing well





Objective





- Results


Result Diagrams: 


 04/04/19 06:10





 04/04/19 06:10


Recent Labs: 


 Laboratory Last Values











WBC  6.9 Th/cmm (4.8-10.8)   04/04/19  06:10    


 


RBC  4.28 Mil/cmm (3.80-5.80)   04/04/19  06:10    


 


Hgb  12.0 gm/dL (12-16)   04/04/19  06:10    


 


Hct  35.9 % (41.0-60)  L  04/04/19  06:10    


 


MCV  83.7 fl (80-99)   04/04/19  06:10    


 


MCH  27.9 pg (27.0-31.0)   04/04/19  06:10    


 


MCHC Differential  33.4 pg (28.0-36.0)   04/04/19  06:10    


 


RDW  12.5 % (11.5-20.0)   04/04/19  06:10    


 


Plt Count  337 Th/cmm (150-400)   04/04/19  06:10    


 


MPV  6.8 fl  04/04/19  06:10    


 


Neutrophils %  68.2 % (40.0-80.0)   04/04/19  06:10    


 


Lymphocytes %  15.0 % (20.0-50.0)  L  04/04/19  06:10    


 


Monocytes %  7.7 % (2.0-10.0)   04/04/19  06:10    


 


Eosinophils %  8.6 % (0.0-5.0)  H  04/04/19  06:10    


 


Basophils %  0.5 % (0.0-2.0)   04/04/19  06:10    


 


PT  9.3 SECONDS (9.5-11.5)  L  04/03/19  18:35    


 


INR  0.88  (0.5-1.4)   04/03/19  18:35    


 


PTT (Actin FS)  25.6 SECONDS (26.0-38.0)  L  04/03/19  18:35    


 


Sodium  140 mEq/L (136-145)   04/04/19  06:10    


 


Potassium  3.5 mEq/L (3.5-5.1)   04/04/19  06:10    


 


Chloride  109 mEq/L ()  H  04/04/19  06:10    


 


Carbon Dioxide  23.5 mEq/L (21.0-31.0)   04/04/19  06:10    


 


Anion Gap  11.0  (7.0-16.0)   04/04/19  06:10    


 


BUN  19 mg/dL (7-25)   04/04/19  06:10    


 


Creatinine  1.3 mg/dL (0.7-1.3)   04/04/19  06:10    


 


Est GFR ( Amer)  > 60.0 ml/min (>90)   04/04/19  06:10    


 


Est GFR (Non-Af Amer)  58.7 ml/min  04/04/19  06:10    


 


BUN/Creatinine Ratio  14.6   04/04/19  06:10    


 


Glucose  104 mg/dL ()   04/04/19  06:10    


 


Whole Bld Lactic Acid  0.65 mmol/L (0.60-1.99)   04/03/19  18:35    


 


Calcium  9.0 mg/dL (8.6-10.3)   04/04/19  06:10    


 


Total Bilirubin  0.4 mg/dL (0.3-1.0)   04/04/19  06:10    


 


AST  14 U/L (13-39)   04/04/19  06:10    


 


ALT  12 U/L (7-52)   04/04/19  06:10    


 


Alkaline Phosphatase  43 U/L ()   04/04/19  06:10    


 


Ammonia  39 umol/L (16-53)   04/04/19  09:10    


 


Creatine Kinase  173 U/L ()   04/03/19  18:35    


 


Troponin I  0.01 ng/mL (0.01-0.05)   04/03/19  18:35    


 


C-Reactive Protein  < 0.2 mg/dL (0.0-0.9)   04/04/19  06:10    


 


Total Protein  6.5 gm/dL (6.0-8.3)   04/04/19  06:10    


 


Albumin  3.6 gm/dL (4.2-5.5)  L  04/04/19  06:10    


 


Globulin  2.9 gm/dL  04/04/19  06:10    


 


Albumin/Globulin Ratio  1.2  (1.0-1.8)   04/04/19  06:10    


 


Triglycerides  94 mg/dL (<150)   04/03/19  18:35    


 


Cholesterol  170 mg/dL (<200)   04/03/19  18:35    


 


LDL Cholesterol Direct  109 mg/dL ()   04/03/19  18:35    


 


HDL Cholesterol  50 mg/dL (23-92)   04/03/19  18:35    


 


Amylase  323 U/L ()  H  04/04/19  06:10    


 


Lipase  500 U/L (11-82)  H  04/04/19  06:10    


 


TSH  0.94 uIU/ml (0.34-5.60)   04/04/19  06:10    


 


Urine Source  MIDSTREAM   04/03/19  18:05    


 


Urine Color  YELLOW   04/03/19  18:05    


 


Urine Clarity  HAZY  (CLEAR)   04/03/19  18:05    


 


Urine pH  6.5  (4.6 - 8.0)   04/03/19  18:05    


 


Ur Specific Gravity  1.015  (1.005-1.030)   04/03/19  18:05    


 


Urine Protein  100 mg/dL (NEGATIVE)  H  04/03/19  18:05    


 


Urine Glucose (UA)  NEGATIVE mg/dL (NEGATIVE)   04/03/19  18:05    


 


Urine Ketones  NEGATIVE mg/dL (NEGATIVE)   04/03/19  18:05    


 


Urine Blood  TRACE  (NEGATIVE)   04/03/19  18:05    


 


Urine Nitrate  POSITIVE  (NEGATIVE)  H  04/03/19  18:05    


 


Urine Bilirubin  NEGATIVE  (NEGATIVE)   04/03/19  18:05    


 


Urine Urobilinogen  0.2 E.U./dL (0.2 - 1.0)   04/03/19  18:05    


 


Ur Leukocyte Esterase  TRACE  (NEGATIVE)  H  04/03/19  18:05    


 


Urine RBC  0-2 /hpf (0-5)  H  04/03/19  18:05    


 


Urine WBC  2-5 /hpf (0-5)   04/03/19  18:05    


 


Ur Epithelial Cells  FEW /lpf (FEW)   04/03/19  18:05    


 


Amorphous Sediment  MODERATE URATES  (NONE SEEN)   04/03/19  18:05    


 


Urine Bacteria  4+ /hpf (NONE SEEN)  H  04/03/19  18:05    


 


Salicylates  < 25.0 mg/L (30.0-100.0)  L  04/03/19  18:35    


 


Urine Opiates Screen  NEGATIVE  (NEGATIVE)   04/03/19  18:05    


 


Urine Methadone Screen  NEGATIVE  (NEGATIVE)   04/03/19  18:05    


 


Acetaminophen  < 10.0 ug/mL (10.0-30.0)  L  04/03/19  18:35    


 


Ur Barbiturates Screen  NEGATIVE  (NEGATIVE)   04/03/19  18:05    


 


Ur Tricyclics Screen  NEGATIVE  (NEGATIVE)   04/03/19  18:05    


 


Ur Phencyclidine Scrn  NEGATIVE  (NEGATIVE)   04/03/19  18:05    


 


Amphetamines Screen  NEGATIVE  (NEGATIVE)   04/03/19  18:05    


 


U Methamphetamines Scrn  NEGATIVE  (NEGATIVE)   04/03/19  18:05    


 


U Benzodiazepines Scrn  NEGATIVE  (NEGATIVE)   04/03/19  18:05    


 


U Cocaine Metab Screen  NEGATIVE  (NEGATIVE)   04/03/19  18:05    


 


U Cannabinoids Screen  NEGATIVE  (NEGATIVE)   04/03/19  18:05    


 


Ethyl Alcohol  < 10 mg/dL (0-10)   04/03/19  18:35    














- Physical Exam


Vitals and I&O: 


 Vital Signs











Temp  98.6 F   04/05/19 15:46


 


Pulse  88   04/05/19 15:46


 


Resp  18   04/05/19 15:46


 


BP  106/64   04/05/19 15:46


 


Pulse Ox  65   04/05/19 15:46








 Intake & Output











 04/04/19 04/05/19 04/05/19





 18:59 06:59 18:59


 


Intake Total 1000 60 1000


 


Output Total  1000 


 


Balance 1000 -940 1000


 


Weight (lbs)  56.699 kg 


 


Intake:   


 


  Intake, IV Amount 1000  1000


 


    D5-0.45NS 1,000 ml @ 60 1000  1000





    mls/hr IV .[id number] [facility]   





    #:[id number]   


 


  Oral  60 


 


Output:   


 


  Urine  1000 


 


Other:   


 


  # Bowel Movements  1 


 


  Weight Source  Bedscale 











Active Medications: 


Current Medications





Amlodipine Besylate (Norvasc)  5 mg PO DAILY AMOS


   Stop: 06/04/19 10:15


   Last Admin: 04/05/19 11:09 Dose:  5 mg


Betamethasone/Clotrimazole (Lotrisone Cream)  1 appl TP BID AMOS


   Stop: 06/03/19 08:59


   Last Admin: 04/05/19 17:41 Dose:  1 appl


Ceftriaxone Sodium 1 gm/ (Sodium Chloride)  50 mls @ 100 mls/hr IV Q24HR AMOS


   Stop: 06/02/19 22:59


   Last Admin: 04/04/19 22:46 Dose:  100 mls/hr


Dextrose/Sodium Chloride (D5-0.45ns)  1,000 mls @ 60 mls/hr IV .[id number] AMOS


   Stop: 06/02/19 22:59


   Last Admin: 04/05/19 11:09 Dose:  60 mls/hr


Multi-Ingredient Cream (Eucerin Cream)  1 appl TP BID AMOS


   Stop: 06/03/19 16:59


   Last Admin: 04/05/19 17:41 Dose:  1 appl


Tamsulosin HCl (Flomax)  0.4 mg PO HS AMOS


   Stop: 06/02/19 23:09


   Last Admin: 04/04/19 20:44 Dose:  0.4 mg








General: Alert, Oriented x3, Cooperative


HEENT: Atraumatic, PERRLA


Neck: Supple


Cardiovascular: Regular rate, Normal S1, Normal S2


Abdomen: Bowel sounds





Assessment/Plan





- Assessment


Assessment: 





1. FTT


2. Poor po intake


3. Hx of CVA





-continue with supportive care and management


-no current plans for any PEG unless develps oropharyngeal dysphagia


-other medical problems per primary

## 2019-04-06 LAB
FOLATE SERPL-MCNC: 11 NG/ML (ref 3–?)
VIT B12 SERPL-MCNC: 417 PG/ML (ref 232–1245)

## 2019-04-06 RX ADMIN — CLOTRIMAZOLE AND BETAMETHASONE DIPROPIONATE SCH APPL: 10; .5 CREAM TOPICAL at 08:40

## 2019-04-06 RX ADMIN — DEXTROSE AND SODIUM CHLORIDE SCH MLS/HR: 5; .45 INJECTION, SOLUTION INTRAVENOUS at 05:23

## 2019-04-06 RX ADMIN — Medication SCH APPL: at 08:40

## 2019-04-06 NOTE — PROGRESS NOTES
DATE:  04/05/2019



NEUROLOGY PROGRESS NOTE:



SUBJECTIVE AND REVIEW OF SYSTEMS:  The patient is lying in bed, awake.  The

patient's dizziness is less.  He has not gotten up yet.  The patient's stroke is

on the right side.  Weakness over the right leg, usually when he gets up.



OBJECTIVE:

VITAL SIGNS:  Temperature 98.2, blood pressure 130/70, pulse is 74.

CARDIOVASCULAR:  Normal heart sounds.

RESPIRATORY:  Lungs clear.

NEUROLOGIC:  The patient is awake.  Right facial weakness.  Spasticity on the

right side.



INVESTIGATIONS:  CT scan head negative.  CT scan, cervical spine, no acute

process.



ASSESSMENT AND PLAN:  Unsteadiness, dizziness.  No acute stroke.  The patient's

carotid Doppler is pending.



At this time continue present treatment.  Continue with physical therapy.



Rule out underlying sepsis.  The patient was seen by Infectious Disease.  The

patient has urinary tract infection.





DD: 04/05/2019 07:49

DT: 04/06/2019 04:37

JOB# [id number]  [id number]

## 2019-04-06 NOTE — PROGRESS NOTES
DATE:  04/06/2019



SUBJECTIVE:  The patient was seen in his room.  The patient denies any pain or

discomfort at this time.  The patient is unable to tolerate treatment. 

Otherwise, the patient appears to be in no acute distress.



OBJECTIVE:

VITAL SIGNS:  Temperature 98.6, heart rate of 88, blood pressure 106/64,

respiratory rate 19 and 95% on room air.

HEENT:  Head is atraumatic and normocephalic.  Eyes:  Bilateral conjunctivae are

clear.  Bilateral pupils equal, round and reactive.

NECK:  Supple.  No JVD.

CARDIOVASCULAR:  S1 and S2 without murmur.

PULMONARY:  Clear to auscultation.

GASTROINTESTINAL:  Soft and nontender without guarding.  Positive bowel sounds.

MUSCULOSKELETAL:  No clubbing.  No cyanosis noted.



ASSESSMENT:

1.  Urinary tract infection.

2.  Hypertension.

3.  History of cerebrovascular accident.

4.  Benign prostatic hypertrophy.



PLAN:  We will continue current treatment.  The patient was ready for discharge

planning.  The patient may go to skilled nursing facility.  Treatment plans were

discussed with the patient's nurse.  Treatment plans were discussed with Dr. Carey.





DD: 04/06/2019 07:06

DT: 04/06/2019 23:30

JOB# [id number]  [id number]

## 2019-04-06 NOTE — INFECTIOUS DISEASE PROG NOTE
Infectious Disease Subjective





- Review of Systems


Service Date: 19


Subjective: 





There is no new change, no fever.





Infectious Disease Objective





- Results


Result Diagrams: 


 19 06:10





 19 06:10


Recent Labs: 


 Laboratory Last Values











WBC  6.9 Th/cmm (4.8-10.8)   19  06:10    


 


RBC  4.28 Mil/cmm (3.80-5.80)   19  06:10    


 


Hgb  12.0 gm/dL (12-16)   19  06:10    


 


Hct  35.9 % (41.0-60)  L  19  06:10    


 


MCV  83.7 fl (80-99)   19  06:10    


 


MCH  27.9 pg (27.0-31.0)   19  06:10    


 


MCHC Differential  33.4 pg (28.0-36.0)   19  06:10    


 


RDW  12.5 % (11.5-20.0)   19  06:10    


 


Plt Count  337 Th/cmm (150-400)   19  06:10    


 


MPV  6.8 fl  19  06:10    


 


Neutrophils %  68.2 % (40.0-80.0)   19  06:10    


 


Lymphocytes %  15.0 % (20.0-50.0)  L  19  06:10    


 


Monocytes %  7.7 % (2.0-10.0)   19  06:10    


 


Eosinophils %  8.6 % (0.0-5.0)  H  19  06:10    


 


Basophils %  0.5 % (0.0-2.0)   19  06:10    


 


PT  9.3 SECONDS (9.5-11.5)  L  19  18:35    


 


INR  0.88  (0.5-1.4)   19  18:35    


 


PTT (Actin FS)  25.6 SECONDS (26.0-38.0)  L  19  18:35    


 


Sodium  140 mEq/L (136-145)   19  06:10    


 


Potassium  3.5 mEq/L (3.5-5.1)   19  06:10    


 


Chloride  109 mEq/L ()  H  19  06:10    


 


Carbon Dioxide  23.5 mEq/L (21.0-31.0)   19  06:10    


 


Anion Gap  11.0  (7.0-16.0)   19  06:10    


 


BUN  19 mg/dL (7-25)   19  06:10    


 


Creatinine  1.3 mg/dL (0.7-1.3)   19  06:10    


 


Est GFR ( Amer)  > 60.0 ml/min (>90)   19  06:10    


 


Est GFR (Non-Af Amer)  58.7 ml/min  19  06:10    


 


BUN/Creatinine Ratio  14.6   19  06:10    


 


Glucose  104 mg/dL ()   19  06:10    


 


Whole Bld Lactic Acid  0.65 mmol/L (0.60-1.99)   19  18:35    


 


Calcium  9.0 mg/dL (8.6-10.3)   19  06:10    


 


Total Bilirubin  0.4 mg/dL (0.3-1.0)   19  06:10    


 


AST  14 U/L (13-39)   19  06:10    


 


ALT  12 U/L (7-52)   19  06:10    


 


Alkaline Phosphatase  43 U/L ()   19  06:10    


 


Ammonia  39 umol/L (16-53)   19  09:10    


 


Creatine Kinase  173 U/L ()   19  18:35    


 


Troponin I  0.01 ng/mL (0.01-0.05)   19  18:35    


 


C-Reactive Protein  < 0.2 mg/dL (0.0-0.9)   19  06:10    


 


Total Protein  6.5 gm/dL (6.0-8.3)   19  06:10    


 


Albumin  3.6 gm/dL (4.2-5.5)  L  19  06:10    


 


Globulin  2.9 gm/dL  19  06:10    


 


Albumin/Globulin Ratio  1.2  (1.0-1.8)   19  06:10    


 


Triglycerides  94 mg/dL (<150)   19  18:35    


 


Cholesterol  170 mg/dL (<200)   19  18:35    


 


LDL Cholesterol Direct  109 mg/dL ()   19  18:35    


 


HDL Cholesterol  50 mg/dL (23-92)   19  18:35    


 


Amylase  323 U/L ()  H  19  06:10    


 


Lipase  500 U/L (11-82)  H  19  06:10    


 


TSH  0.94 uIU/ml (0.34-5.60)   19  06:10    


 


Urine Source  MIDSTREAM   19  18:05    


 


Urine Color  YELLOW   19  18:05    


 


Urine Clarity  HAZY  (CLEAR)   19  18:05    


 


Urine pH  6.5  (4.6 - 8.0)   19  18:05    


 


Ur Specific Gravity  1.015  (1.005-1.030)   19  18:05    


 


Urine Protein  100 mg/dL (NEGATIVE)  H  19  18:05    


 


Urine Glucose (UA)  NEGATIVE mg/dL (NEGATIVE)   19  18:05    


 


Urine Ketones  NEGATIVE mg/dL (NEGATIVE)   19  18:05    


 


Urine Blood  TRACE  (NEGATIVE)   19  18:05    


 


Urine Nitrate  POSITIVE  (NEGATIVE)  H  19  18:05    


 


Urine Bilirubin  NEGATIVE  (NEGATIVE)   19  18:05    


 


Urine Urobilinogen  0.2 E.U./dL (0.2 - 1.0)   19  18:05    


 


Ur Leukocyte Esterase  TRACE  (NEGATIVE)  H  19  18:05    


 


Urine RBC  0-2 /hpf (0-5)  H  19  18:05    


 


Urine WBC  2-5 /hpf (0-5)   19  18:05    


 


Ur Epithelial Cells  FEW /lpf (FEW)   19  18:05    


 


Amorphous Sediment  MODERATE URATES  (NONE SEEN)   19  18:05    


 


Urine Bacteria  4+ /hpf (NONE SEEN)  H  19  18:05    


 


Salicylates  < 25.0 mg/L (30.0-100.0)  L  19  18:35    


 


Urine Opiates Screen  NEGATIVE  (NEGATIVE)   19  18:05    


 


Urine Methadone Screen  NEGATIVE  (NEGATIVE)   19  18:05    


 


Acetaminophen  < 10.0 ug/mL (10.0-30.0)  L  19  18:35    


 


Ur Barbiturates Screen  NEGATIVE  (NEGATIVE)   19  18:05    


 


Ur Tricyclics Screen  NEGATIVE  (NEGATIVE)   19  18:05    


 


Ur Phencyclidine Scrn  NEGATIVE  (NEGATIVE)   19  18:05    


 


Amphetamines Screen  NEGATIVE  (NEGATIVE)   19  18:05    


 


U Methamphetamines Scrn  NEGATIVE  (NEGATIVE)   19  18:05    


 


U Benzodiazepines Scrn  NEGATIVE  (NEGATIVE)   19  18:05    


 


U Cocaine Metab Screen  NEGATIVE  (NEGATIVE)   19  18:05    


 


U Cannabinoids Screen  NEGATIVE  (NEGATIVE)   19  18:05    


 


Ethyl Alcohol  < 10 mg/dL (0-10)   19  18:35    


 


RPR  NONREACTIVE  (NONREACTIVE)   19  18:35    














- Physical Exam


Vitals and I&O: 


 Vital Signs











Temp  98.6 F   19 15:46


 


Pulse  88   19 15:46


 


Resp  18   19 15:46


 


BP  106/64   19 15:46


 


Pulse Ox  65   19 15:46








 Intake & Output











 19





 06:59 18:59 06:59


 


Intake Total 60 1000 


 


Output Total 1000  


 


Balance -940 1000 


 


Weight (lbs) 56.699 kg  


 


Intake:   


 


  Intake, IV Amount  1000 


 


    D5-0.45NS 1,000 ml @ 60  1000 





    mls/hr IV .[id number] [facility]   





    #:[id number]   


 


  Oral 60  


 


Output:   


 


  Urine 1000  


 


Other:   


 


  # Bowel Movements 1  


 


  Weight Source Bedscale  











Active Medications: 


Current Medications





Amlodipine Besylate (Norvasc)  5 mg PO DAILY AMOS


   Stop: 19 10:15


   Last Admin: 19 11:09 Dose:  5 mg


Betamethasone/Clotrimazole (Lotrisone Cream)  1 appl TP BID AMOS


   Stop: 19 08:59


   Last Admin: 19 17:41 Dose:  1 appl


Ceftriaxone Sodium 1 gm/ (Sodium Chloride)  50 mls @ 100 mls/hr IV Q24HR AMOS


   Stop: 19 22:59


   Last Admin: 19 22:46 Dose:  100 mls/hr


Dextrose/Sodium Chloride (D5-0.45ns)  1,000 mls @ 60 mls/hr IV .[id number] AMOS


   Stop: 19 22:59


   Last Admin: 19 11:09 Dose:  60 mls/hr


Multi-Ingredient Cream (Eucerin Cream)  1 appl TP BID AMOS


   Stop: 19 16:59


   Last Admin: 19 17:41 Dose:  1 appl


Tamsulosin HCl (Flomax)  0.4 mg PO HS AMOS


   Stop: 19 23:09


   Last Admin: 19 20:38 Dose:  0.4 mg








General: no acute distress, well developed, well nourished


HEENT: atraumatic, normocephalic, PERRLA, EOMI


Neck: supple, no thyromegaly, no lymphadenopathy


Cardiovascular: S1S2, regular


Lungs: clear to auscultation bilaterally, clear to percussion


Abdomen: soft, no tender, no distended, no mass


Extremities: no cyanosis, no clubbing, no edema


Neurological: awake, alert, oriented


Skin: intact





Infectious Disease Assmt/Plan





- Assessment


Assessment: 





IMPRESSION:


1. Urinary tract infection.


2. Tinea pedis.


3. Hypertension.


4. Dyslipidemia.














- Plan


Plan: 





apply Lotrisone cream and continue Rocephin.  Follow the culture report and go 

from there.  The patient has to keep appropriate hygiene.


dc planning





Nutritional Asmnt/Malnutr-PDOC





- Dietary Evaluation


Malnutrition Findings (Please click <Entered> for more info): 








Nutritional Asmnt/Malnutrition                             Start:  19 16:

32


Text:                                                      Status: Complete    

  


Freq:                                                                          

  


Protocol:                                                                      

  


 Document     19 16:32  LCHENG  (Rec: 19 16:43  LCHENG  [id number])


 Nutritional Asmnt/Malnutrition


     Patient General Information


      Diagnosis                                  generalized weakness/FTT


      Pertinent Medical Hx/Surgical Hx           HTN, dyslipidemia


      Subjective Information                     Consult received for Constantino 11


                                                 . Pt seen lying in bed at time


                                                 of visit, awake and alert. Pt


                                                 stated food is good, appetite


                                                 is good. Food preference


                                                 provided to RD.


      Current Diet Order/ Nutrition Support      regular


      Pertinent Medications                      D5-0.45ns


      Pertinent Labs                             / Cl 109, alb 3.6


     Nutritional Hx/Data


      Height                                     1.63 m


      Height (Calculated Centimeters)            162.6


      Current Weight (lbs)                       56.699 kg


      Weight (Calculated Kilograms)              56.7


      Weight (Calculated Grams)                  23184.0


      Ideal Body Weight                          120


      Body Mass Index (BMI)                      21.4


     GI Symptoms


      GI Symptoms                                None


      Last BM                                    not indicated


      Difficult in:                              None


      Skin Integrity/Comment:                    swelling to L/F foot


                                                 1~2+ non-pitting edema


     Estimated Nutritional Goals


      BEE in Kcals:                              Using Current wt


      Calories/Kcals/Kg                          25-30


      Kcals Calculated                           0384-1927


      Protein:                                   Using Current wt


      Protein g/k


      Protein Calculated                         57


      Fluid: ml                                  1425-1710ml (1ml/kcal)


     Nutritional Problem


      No current Nutrition Prob


       Problem                                   N/A


     Malnutrition Alert


      Is there a minimum of two criteria         No


       selected?                                 


       Query Text:Check all the applicable       


       criteria. A minimum of two criteria are   


       recommended for diagnosis of either       


       severe or non-severe malnutrition.        


     Malnutrition Related to Morbid Obesity


      Malnutrition related to morbid obesity     No


     Intervention/Recommendation


      Comments                                   1. Continue with regular diet


                                                 as ordered.


                                                 2. Monitor PO intake, wt, labs


                                                 and skin integrity


                                                 3. F/U as moderate risk in 3-5


                                                 days


     Expected Outcomes/Goals


      Expected Outcomes/Goals                    1. PO intake to meet at least


                                                 75% of nutritional needs.


                                                 2. Wt stability, skin to


                                                 remain intact, labs to


                                                 approach WNL.